# Patient Record
Sex: MALE | Race: WHITE
[De-identification: names, ages, dates, MRNs, and addresses within clinical notes are randomized per-mention and may not be internally consistent; named-entity substitution may affect disease eponyms.]

---

## 2021-06-14 ENCOUNTER — TRANSCRIPTION ENCOUNTER (OUTPATIENT)
Age: 66
End: 2021-06-14

## 2021-06-15 PROBLEM — Z00.00 ENCOUNTER FOR PREVENTIVE HEALTH EXAMINATION: Status: ACTIVE | Noted: 2021-06-15

## 2021-06-16 ENCOUNTER — APPOINTMENT (OUTPATIENT)
Dept: OTOLARYNGOLOGY | Facility: CLINIC | Age: 66
End: 2021-06-16
Payer: COMMERCIAL

## 2021-06-16 VITALS — HEIGHT: 69 IN | WEIGHT: 308 LBS | BODY MASS INDEX: 45.62 KG/M2

## 2021-06-16 DIAGNOSIS — H61.20 IMPACTED CERUMEN, UNSPECIFIED EAR: ICD-10-CM

## 2021-06-16 PROCEDURE — 99204 OFFICE O/P NEW MOD 45 MIN: CPT | Mod: 25

## 2021-06-16 PROCEDURE — G0268 REMOVAL OF IMPACTED WAX MD: CPT

## 2021-06-16 PROCEDURE — 99072 ADDL SUPL MATRL&STAF TM PHE: CPT

## 2021-06-16 PROCEDURE — 92550 TYMPANOMETRY & REFLEX THRESH: CPT

## 2021-06-16 PROCEDURE — 92557 COMPREHENSIVE HEARING TEST: CPT

## 2021-06-16 RX ORDER — PREDNISONE 10 MG/1
10 TABLET ORAL
Qty: 50 | Refills: 0 | Status: ACTIVE | COMMUNITY
Start: 2021-06-16 | End: 1900-01-01

## 2021-06-16 NOTE — PHYSICAL EXAM
[Midline] : trachea located in midline position [Normal] : no rashes [de-identified] : bilateral impacted wax cleaned with curette

## 2021-06-16 NOTE — HISTORY OF PRESENT ILLNESS
[de-identified] : Patient presents today c/o hearing loss . Reports it started five days ago.  Urgent care saw no wax. . No testing has been done. No recent medications. Denies head trauma or noise exposure. Denies dizziness. States tinnitus sounds like "a bubbling" in the ears. \par \par vaccinated for covid with moderna 20 days ago, 1st dose.

## 2021-06-16 NOTE — ASSESSMENT
[FreeTextEntry1] : I reviewed, interpreted, and discussed the Audiogram done today. left profound hearing loss, sensorineural. \par \par I discussed the benefits of oral steroids for patient's current situation, and made the patient aware of side effects of systemic corticosteroids. I recommended a low sugar and low salt diet while on steroid treatment, while alerting patient about potential temporary increase in sugar levels and blood pressure. Patient aware of risk of impact on sleep quality and mood temporarily while on the medication.\par \par

## 2021-07-14 ENCOUNTER — APPOINTMENT (OUTPATIENT)
Dept: OTOLARYNGOLOGY | Facility: CLINIC | Age: 66
End: 2021-07-14
Payer: COMMERCIAL

## 2021-07-14 DIAGNOSIS — H90.5 UNSPECIFIED SENSORINEURAL HEARING LOSS: ICD-10-CM

## 2021-07-14 PROCEDURE — 99214 OFFICE O/P EST MOD 30 MIN: CPT | Mod: 25

## 2021-07-14 PROCEDURE — 99072 ADDL SUPL MATRL&STAF TM PHE: CPT

## 2021-07-14 PROCEDURE — 92504 EAR MICROSCOPY EXAMINATION: CPT

## 2021-07-14 PROCEDURE — 69801 INCISE INNER EAR: CPT

## 2021-07-14 NOTE — ASSESSMENT
[FreeTextEntry1] : MRI results reviewed and discussed with the patient.\par Given that there was no improvement as per patient, I gave his the option of another course of oral steroid or transtympanic injection. Pros and cons and risks were discussed.\par \par Patient opted for an injection, done today.\par \par RTC in 1 week for repeat audiogram.

## 2021-07-14 NOTE — PROCEDURE
[[facility] [Same] : same as the Pre Op Dx. [FreeTextEntry2] : left sided [FreeTextEntry4] : Under microscope guidance, Phenol was used on the TM, then about 0.5cc of dexamethasone was injected into the middle ear space. Patient advised to avoid swallowing for 5minutes then he was discharged.

## 2021-07-14 NOTE — HISTORY OF PRESENT ILLNESS
[de-identified] : Patient presents today c/o hearing loss . Reports it started five days ago.  Urgent care saw no wax. . No testing has been done. No recent medications. Denies head trauma or noise exposure. Denies dizziness. States tinnitus sounds like "a bubbling" in the ears. \par \par vaccinated for covid with moderna 20 days ago, 1st dose.  [FreeTextEntry1] : \par 7/14/21: Patient presents today following up on hearing loss. Patient here to discuss MRI results. He admits minimal improvement with steroids.

## 2021-07-21 ENCOUNTER — APPOINTMENT (OUTPATIENT)
Dept: OTOLARYNGOLOGY | Facility: CLINIC | Age: 66
End: 2021-07-21